# Patient Record
Sex: MALE | Race: BLACK OR AFRICAN AMERICAN | NOT HISPANIC OR LATINO | URBAN - METROPOLITAN AREA
[De-identification: names, ages, dates, MRNs, and addresses within clinical notes are randomized per-mention and may not be internally consistent; named-entity substitution may affect disease eponyms.]

---
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
## 2018-01-22 ENCOUNTER — APPOINTMENT (OUTPATIENT)
Dept: ENDOCRINOLOGY | Facility: CLINIC | Age: 18
End: 2018-01-22
Payer: MEDICAID

## 2018-01-22 VITALS
WEIGHT: 240 LBS | BODY MASS INDEX: 32.51 KG/M2 | HEART RATE: 67 BPM | OXYGEN SATURATION: 98 % | SYSTOLIC BLOOD PRESSURE: 120 MMHG | DIASTOLIC BLOOD PRESSURE: 80 MMHG | HEIGHT: 72 IN

## 2018-01-22 DIAGNOSIS — R79.89 OTHER SPECIFIED ABNORMAL FINDINGS OF BLOOD CHEMISTRY: ICD-10-CM

## 2018-01-22 PROCEDURE — 99204 OFFICE O/P NEW MOD 45 MIN: CPT

## 2018-01-23 DIAGNOSIS — R79.89 OTHER SPECIFIED ABNORMAL FINDINGS OF BLOOD CHEMISTRY: ICD-10-CM

## 2018-07-25 PROBLEM — R79.89 LOW TESTOSTERONE IN MALE: Status: ACTIVE | Noted: 2018-01-23

## 2018-07-25 PROBLEM — R79.89 ABNORMAL THYROID BLOOD TEST: Status: ACTIVE | Noted: 2018-01-22

## 2019-09-06 PROBLEM — J45.909 UNSPECIFIED ASTHMA, UNCOMPLICATED: Chronic | Status: ACTIVE | Noted: 2017-02-01

## 2019-09-06 PROBLEM — S05.00XA INJURY OF CONJUNCTIVA AND CORNEAL ABRASION WITHOUT FOREIGN BODY, UNSPECIFIED EYE, INITIAL ENCOUNTER: Chronic | Status: ACTIVE | Noted: 2017-02-01

## 2019-09-17 ENCOUNTER — APPOINTMENT (OUTPATIENT)
Dept: PULMONOLOGY | Facility: CLINIC | Age: 19
End: 2019-09-17

## 2022-10-16 ENCOUNTER — EMERGENCY (EMERGENCY)
Facility: HOSPITAL | Age: 22
LOS: 1 days | Discharge: HOME | End: 2022-10-16
Attending: EMERGENCY MEDICINE | Admitting: EMERGENCY MEDICINE

## 2022-10-16 VITALS
SYSTOLIC BLOOD PRESSURE: 130 MMHG | TEMPERATURE: 98 F | HEIGHT: 72 IN | DIASTOLIC BLOOD PRESSURE: 65 MMHG | RESPIRATION RATE: 18 BRPM | OXYGEN SATURATION: 99 % | HEART RATE: 85 BPM

## 2022-10-16 PROCEDURE — 99283 EMERGENCY DEPT VISIT LOW MDM: CPT

## 2022-10-16 NOTE — ED PROVIDER NOTE - PHYSICAL EXAMINATION
Const: not in acute distress  Eyes: no conjunctival injection  HEENT: Head NCAT, Moist MM.  Neck: Trachea midline.   CVS: +S1/S2, Peripheral pulses 2+ and equal in all extremities.  RESP: Unlabored respiratory effort. Clear to auscultation bilaterally.  GI: Nontender/Nondistended, No CVA tenderness b/l.   MSK: Normocephalic/Atraumatic, No Lower Extremities edema b/l.   Skin: Intact.   Neuro: CNs II-XII grossly intact. Motor 5/5 in all four extremities and sensation intact throughout.  Psych: Awake, Alert, & Oriented (AAO) x3.

## 2022-10-16 NOTE — ED PROVIDER NOTE - NSFOLLOWUPINSTRUCTIONS_ED_ALL_ED_FT
You presented to the emergency room today with headache with a concern for stroke. Based on your physical exam in the emergency room, it is unlikely that you are currently having a stroke. You will be referred to a neurologist for follow up for your headache. You can take tylenol or ibuprofen as directed on the box for better pain control for your headache. If you have increasing headache, confusion, slurred speech, numbness/weakness in an extremity, please return back to the emergency department.

## 2022-10-16 NOTE — ED PROVIDER NOTE - OBJECTIVE STATEMENT
22 yo male with no significant PMhx presenting with HA x3 days and "confusion" x1 day. Patient has been having a mild posterior headache for the past 3 days. This morning patient felt confused and weak, and was concerned for a stroke. Patient states he was feeling weak in his left arm and bilateral legs, but patient was able to move all four limbs and was able to walk. Patient states that he has been feeling "off" and lazy since this morning. Patient denies fever, n/v, photophobia, phonophobia, CP, SOB, abd pain, and dysuria. Patient last saw his PCP a month ago with normal results. 22 yo male with no significant PMhx presenting with HA x3 days and "confusion" x1 day. Patient has been having a mild posterior headache for the past 3 days. This morning patient felt confused and weak, and was concerned for a stroke. Patient states he was feeling weak in his left arm and bilateral legs, but patient was able to move all four limbs and was able to walk. Patient states that he has been feeling "off" and lazy since this morning. Patient does endorse more stress in his life. Patient denies fever, n/v, photophobia, phonophobia, CP, SOB, abd pain, and dysuria. Patient last saw his PCP a month ago with normal results.

## 2022-10-16 NOTE — ED PROVIDER NOTE - NSICDXFAMILYHX_GEN_ALL_CORE_FT
FAMILY HISTORY:  No pertinent family history in first degree relatives    Mother  Still living? Unknown  Family history of diabetes mellitus, Age at diagnosis: Age Unknown    Grandparent  Still living? Yes, Estimated age: Age Unknown  Family history of diabetes mellitus, Age at diagnosis: Age Unknown

## 2022-10-16 NOTE — ED PROVIDER NOTE - ATTENDING CONTRIBUTION TO CARE
20 yo male with no significant PMhx presenting with HA x3 days and "confusion" x1 day. Patient has been having a mild posterior headache for the past 3 days. This morning patient felt confused and weak, and was concerned for a stroke. Patient states he was feeling weak in his left arm and bilateral legs, but patient was able to move all four limbs and was able to walk. Patient states that he has been feeling "off" and lazy since this morning. Patient does endorse more stress in his life. Patient denies fever, n/v, photophobia, phonophobia, CP, SOB, abd pain, and dysuria. Patient last saw his PCP a month ago with normal results.  admits to daily weed smoking, with no smoking yesterday. possible withdrawal, no stroke symptoms, mp fa,o;u jx/

## 2022-10-16 NOTE — ED PROVIDER NOTE - NS ED ROS FT
CONST: no fevers, no chills, no trauma  EYES: no blurry vision   ENT: no sore throat  CV: no chest pain, no extremity swelling  RESP: no shortness of breath  ABD: no abdominal pain, no nausea, no vomiting, no diarrhea, no melena  : no dysuria, no hematuria, no frequency  MSK: no back pain, no neck pain, no extremity pain  NEURO: (+) headache, no focal weakness, no dizziness  HEME: no bruising  SKIN: no rash

## 2022-10-16 NOTE — ED PROVIDER NOTE - PATIENT PORTAL LINK FT
You can access the FollowMyHealth Patient Portal offered by [facility] by registering at the following website: [email or web address]. By joining [employer]’s FollowMyHealth portal, you will also be able to view your health information using other applications (apps) compatible with our system.

## 2022-10-16 NOTE — ED PROVIDER NOTE - NSFOLLOWUPCLINICS_GEN_ALL_ED_FT
[facility]  Neurology  [address] [place] [address] [address] [place] [address]  Phone: [phone]  Fax:

## 2022-10-16 NOTE — ED PROVIDER NOTE - NSICDXPASTMEDICALHX_GEN_ALL_CORE_FT
PAST MEDICAL HISTORY:  Asthma no acute exacerbations    Corneal abrasion 2015    No pertinent past medical history

## 2022-10-16 NOTE — ED PROVIDER NOTE - CLINICAL SUMMARY MEDICAL DECISION MAKING FREE TEXT BOX
22 yo male with no significant PMhx presenting with HA x3 days and "confusion" x1 day. The differential diagnosis includes but is not limited to tension headache, less likely migraine, less likely cluster headache. Not likely CVA, SDH or intracranial hemorrhage. Offered HA medication, but patient refused. Will give neuro follow-up
